# Patient Record
Sex: FEMALE | Race: WHITE
[De-identification: names, ages, dates, MRNs, and addresses within clinical notes are randomized per-mention and may not be internally consistent; named-entity substitution may affect disease eponyms.]

---

## 2020-05-23 ENCOUNTER — NURSE TRIAGE (OUTPATIENT)
Dept: OTHER | Facility: CLINIC | Age: 70
End: 2020-05-23

## 2020-05-23 NOTE — TELEPHONE ENCOUNTER
Reason for Disposition   [1] MODERATE back pain (e.g., interferes with normal activities) AND [2] present > 3 days    Answer Assessment - Initial Assessment Questions  1. ONSET: \"When did the pain begin? \"       Thursday   2. LOCATION: \"Where does it hurt? \" (upper, mid or lower back)      Left side of back between mid back and kidney area   3. SEVERITY: \"How bad is the pain? \"  (e.g., Scale 1-10; mild, moderate, or severe)    - MILD (1-3): doesn't interfere with normal activities     - MODERATE (4-7): interferes with normal activities or awakens from sleep     - SEVERE (8-10): excruciating pain, unable to do any normal activities       10/10  4. PATTERN: \"Is the pain constant? \" (e.g., yes, no; constant, intermittent)       Intermittent   5. RADIATION: \"Does the pain shoot into your legs or elsewhere? \"      Back only   6. CAUSE:  \"What do you think is causing the back pain? \"       unk   7. BACK OVERUSE:  Rogenia Grippe recent lifting of heavy objects, strenuous work or exercise? \"      No   8. MEDICATIONS: \"What have you taken so far for the pain? \" (e.g., nothing, acetaminophen, NSAIDS)      Tylenol   9. NEUROLOGIC SYMPTOMS: \"Do you have any weakness, numbness, or problems with bowel/bladder control? \"      denies  10. OTHER SYMPTOMS: \"Do you have any other symptoms? \" (e.g., fever, abdominal pain, burning with urination, blood in urine)        Denies   11. PREGNANCY: \"Is there any chance you are pregnant? \" (e.g., yes, no; LMP)        NA    Protocols used: BACK PAIN-ADULT-    Geri Armas is pt PCP   This triage is a result of a call to Ignacio a Nurse. Please do not respond to the triage nurse through this encounter. Any subsequent communication should be directly with the patient.